# Patient Record
Sex: FEMALE | Race: WHITE | NOT HISPANIC OR LATINO
[De-identification: names, ages, dates, MRNs, and addresses within clinical notes are randomized per-mention and may not be internally consistent; named-entity substitution may affect disease eponyms.]

---

## 2023-11-13 ENCOUNTER — NON-APPOINTMENT (OUTPATIENT)
Age: 58
End: 2023-11-13

## 2023-11-13 ENCOUNTER — APPOINTMENT (OUTPATIENT)
Dept: CARDIOLOGY | Facility: CLINIC | Age: 58
End: 2023-11-13
Payer: COMMERCIAL

## 2023-11-13 VITALS
RESPIRATION RATE: 18 BRPM | WEIGHT: 293 LBS | BODY MASS INDEX: 47.09 KG/M2 | HEART RATE: 112 BPM | SYSTOLIC BLOOD PRESSURE: 132 MMHG | OXYGEN SATURATION: 98 % | HEIGHT: 66 IN | DIASTOLIC BLOOD PRESSURE: 86 MMHG | TEMPERATURE: 97.8 F

## 2023-11-13 VITALS — DIASTOLIC BLOOD PRESSURE: 84 MMHG | SYSTOLIC BLOOD PRESSURE: 136 MMHG

## 2023-11-13 DIAGNOSIS — M54.16 RADICULOPATHY, LUMBAR REGION: ICD-10-CM

## 2023-11-13 DIAGNOSIS — Z82.49 FAMILY HISTORY OF ISCHEMIC HEART DISEASE AND OTHER DISEASES OF THE CIRCULATORY SYSTEM: ICD-10-CM

## 2023-11-13 DIAGNOSIS — R03.0 ELEVATED BLOOD-PRESSURE READING, W/OUT DIAGNOSIS OF HYPERTENSION: ICD-10-CM

## 2023-11-13 DIAGNOSIS — Z87.442 PERSONAL HISTORY OF URINARY CALCULI: ICD-10-CM

## 2023-11-13 DIAGNOSIS — Z87.19 PERSONAL HISTORY OF OTHER DISEASES OF THE DIGESTIVE SYSTEM: ICD-10-CM

## 2023-11-13 PROCEDURE — 93000 ELECTROCARDIOGRAM COMPLETE: CPT

## 2023-11-13 PROCEDURE — 99204 OFFICE O/P NEW MOD 45 MIN: CPT | Mod: 25

## 2023-11-13 RX ORDER — APIXABAN 5 MG/1
5 TABLET, FILM COATED ORAL
Qty: 180 | Refills: 3 | Status: ACTIVE | COMMUNITY
Start: 2023-11-13 | End: 1900-01-01

## 2023-11-13 RX ORDER — METOPROLOL TARTRATE 25 MG/1
25 TABLET, FILM COATED ORAL TWICE DAILY
Qty: 180 | Refills: 3 | Status: ACTIVE | COMMUNITY

## 2023-11-13 RX ORDER — METOPROLOL SUCCINATE 25 MG/1
25 TABLET, EXTENDED RELEASE ORAL DAILY
Qty: 90 | Refills: 3 | Status: ACTIVE | COMMUNITY
Start: 2023-11-13 | End: 1900-01-01

## 2023-11-13 RX ORDER — ALPRAZOLAM 0.5 MG/1
0.5 TABLET ORAL
Refills: 0 | Status: ACTIVE | COMMUNITY

## 2023-11-13 RX ORDER — ZOLPIDEM TARTRATE 10 MG/1
10 TABLET ORAL
Refills: 0 | Status: ACTIVE | COMMUNITY

## 2023-11-13 RX ORDER — ALBUTEROL 90 MCG
90 AEROSOL (GRAM) INHALATION
Refills: 0 | Status: ACTIVE | COMMUNITY

## 2023-11-13 RX ORDER — ESCITALOPRAM OXALATE 20 MG/1
20 TABLET, FILM COATED ORAL
Refills: 0 | Status: ACTIVE | COMMUNITY

## 2023-12-01 ENCOUNTER — APPOINTMENT (OUTPATIENT)
Dept: CARDIOLOGY | Facility: CLINIC | Age: 58
End: 2023-12-01
Payer: COMMERCIAL

## 2023-12-01 PROCEDURE — 93306 TTE W/DOPPLER COMPLETE: CPT

## 2023-12-01 PROCEDURE — 93246 EXT ECG>7D<15D RECORDING: CPT

## 2023-12-18 ENCOUNTER — APPOINTMENT (OUTPATIENT)
Dept: BARIATRICS/WEIGHT MGMT | Facility: CLINIC | Age: 58
End: 2023-12-18
Payer: COMMERCIAL

## 2023-12-18 VITALS — HEIGHT: 66 IN | BODY MASS INDEX: 47.09 KG/M2 | WEIGHT: 293 LBS

## 2023-12-18 PROCEDURE — 97802 MEDICAL NUTRITION INDIV IN: CPT

## 2023-12-21 ENCOUNTER — APPOINTMENT (OUTPATIENT)
Dept: CARDIOLOGY | Facility: CLINIC | Age: 58
End: 2023-12-21
Payer: COMMERCIAL

## 2023-12-21 PROCEDURE — 93015 CV STRESS TEST SUPVJ I&R: CPT

## 2024-01-04 ENCOUNTER — NON-APPOINTMENT (OUTPATIENT)
Age: 59
End: 2024-01-04

## 2024-01-04 PROCEDURE — 93248 EXT ECG>7D<15D REV&INTERPJ: CPT

## 2024-01-18 ENCOUNTER — APPOINTMENT (OUTPATIENT)
Dept: BARIATRICS/WEIGHT MGMT | Facility: CLINIC | Age: 59
End: 2024-01-18
Payer: COMMERCIAL

## 2024-01-18 VITALS — HEIGHT: 66 IN | WEIGHT: 293 LBS | BODY MASS INDEX: 47.09 KG/M2

## 2024-01-18 PROCEDURE — 97803 MED NUTRITION INDIV SUBSEQ: CPT

## 2024-01-22 ENCOUNTER — APPOINTMENT (OUTPATIENT)
Dept: CARDIOLOGY | Facility: CLINIC | Age: 59
End: 2024-01-22
Payer: COMMERCIAL

## 2024-01-22 VITALS
HEART RATE: 108 BPM | BODY MASS INDEX: 47.09 KG/M2 | OXYGEN SATURATION: 99 % | HEIGHT: 66 IN | DIASTOLIC BLOOD PRESSURE: 80 MMHG | SYSTOLIC BLOOD PRESSURE: 132 MMHG | WEIGHT: 293 LBS

## 2024-01-22 DIAGNOSIS — Z92.89 PERSONAL HISTORY OF OTHER MEDICAL TREATMENT: ICD-10-CM

## 2024-01-22 DIAGNOSIS — Z13.220 ENCOUNTER FOR SCREENING FOR LIPOID DISORDERS: ICD-10-CM

## 2024-01-22 DIAGNOSIS — I48.91 UNSPECIFIED ATRIAL FIBRILLATION: ICD-10-CM

## 2024-01-22 DIAGNOSIS — Z98.890 OTHER SPECIFIED POSTPROCEDURAL STATES: ICD-10-CM

## 2024-01-22 PROCEDURE — 99214 OFFICE O/P EST MOD 30 MIN: CPT

## 2024-01-23 PROBLEM — Z98.890 HISTORY OF HOLTER MONITORING: Status: RESOLVED | Noted: 2024-01-05 | Resolved: 2024-01-23

## 2024-01-23 PROBLEM — I48.91 ATRIAL FIBRILLATION: Status: ACTIVE | Noted: 2023-11-13

## 2024-01-23 PROBLEM — Z92.89 HISTORY OF ECHOCARDIOGRAM: Status: RESOLVED | Noted: 2024-01-23 | Resolved: 2024-01-23

## 2024-01-23 PROBLEM — Z13.220 LIPID SCREENING: Status: ACTIVE | Noted: 2023-11-13

## 2024-01-23 NOTE — HISTORY OF PRESENT ILLNESS
[FreeTextEntry1] : No further vaginal bleed No chest pain shortness of breath Dizziness with quick change in position, sometimes, with quick resolution She walks 3 times a week, has been working with weight loss center and has lost 17 pounds  Patient snores and may have sleep apnea

## 2024-01-23 NOTE — REVIEW OF SYSTEMS
[Weight Loss (___ Lbs)] : [unfilled] ~Ulb weight loss [Snoring] : snoring [Under Stress] : under stress [Negative] : Heme/Lymph [FreeTextEntry5] : see HPI

## 2024-02-15 ENCOUNTER — APPOINTMENT (OUTPATIENT)
Dept: BARIATRICS/WEIGHT MGMT | Facility: CLINIC | Age: 59
End: 2024-02-15
Payer: COMMERCIAL

## 2024-02-15 VITALS — WEIGHT: 293 LBS | HEIGHT: 66 IN | BODY MASS INDEX: 47.09 KG/M2

## 2024-02-15 PROCEDURE — 97803 MED NUTRITION INDIV SUBSEQ: CPT

## 2024-02-16 ENCOUNTER — APPOINTMENT (OUTPATIENT)
Dept: PULMONOLOGY | Facility: CLINIC | Age: 59
End: 2024-02-16
Payer: COMMERCIAL

## 2024-02-16 VITALS
WEIGHT: 293 LBS | HEART RATE: 97 BPM | HEIGHT: 66 IN | BODY MASS INDEX: 47.09 KG/M2 | DIASTOLIC BLOOD PRESSURE: 72 MMHG | SYSTOLIC BLOOD PRESSURE: 126 MMHG | OXYGEN SATURATION: 99 %

## 2024-02-16 DIAGNOSIS — Z91.89 OTHER SPECIFIED PERSONAL RISK FACTORS, NOT ELSEWHERE CLASSIFIED: ICD-10-CM

## 2024-02-16 DIAGNOSIS — J45.909 UNSPECIFIED ASTHMA, UNCOMPLICATED: ICD-10-CM

## 2024-02-16 PROCEDURE — 99204 OFFICE O/P NEW MOD 45 MIN: CPT

## 2024-02-16 NOTE — HISTORY OF PRESENT ILLNESS
[TextBox_4] : 59 yo never smoker F w/ hx of AF, HTD, DM, presenting for evaluation of ZAIDA evaluation prior to HEATHER/cardioversion.  Has sleep symptoms including snoring, witnessed apneas (as conveyed to her by her ), EDS, and frequent night time awakenings. She also has some dyspnea with exertion and sensitivity to cold (wheezing, coughing) and has been prescribed albuterol, which helps her symptoms when this happens.    [Awakes Unrefreshed] : awakes unrefreshed [Awakes with Dry Mouth] : awakes with dry mouth [Awakes with Headache] : awakes with headache [Cataplexy] : denies cataplexy [Daytime Somnolence] : daytime somnolence [Difficulty Initiating Sleep] : does not have difficulty initiating sleep [Difficulty Maintaining Sleep] : difficulty maintaining sleep [Dysesthesias] : denies dysesthesias [Fatigue] : fatigue [Frequent Nocturnal Awakening] : frequent nocturnal awakening [Hypnagogic Hallucinations] : denies hypnagogic hallucinations [Hypnopompic Hallucinations] : denies hypnopompic hallucinations [Nonrestorative Sleep] : nonrestorative sleep [Paresthesias] : denies paresthesias [Recent  Weight Gain] : no recent weight gain [Sleep Paralysis] : no history of sleep paralysis [Snoring] : snoring [Tired while Driving] : not tired while driving [Unintentional Sleep while Active] : no unintentional sleep while active [Unintentional Sleep while Inactive] : unintentional sleep while inactive [Unusual Movements] : no unusual movements [Unusual Sleep Behavior] : no unusual sleep behavior [Vivid dreams] : no vivid dreams [Witnessed Apneas] : witnessed apneas

## 2024-02-16 NOTE — ASSESSMENT
[FreeTextEntry1] : 57 yo never smoker F w/ hx of AF, HTD, DM, presenting for evaluation of ZAIDA evaluation prior to HEATHER/cardioversion.   > ZAIDA > Asthma  - Sleep symptoms with AF history concerning for ZAIDA. discussed PSG vs HST which patient much prefers the latter. Discussed also possibility of needing PSG in the future if HST is equivocal with results. Will order HST for now. The ramifications of obstructive sleep apnea and its potential therapeutic modalities were discussed with the patient. The dangers of drowsy driving were discussed with the patient. The patient was warned to avoid drowsy driving. The patient will followup after results of this study are available.  - PFT + FeNO for possible asthma - currently to continue with albuterol prn.   f/u after sleep tests are resulted.

## 2024-03-14 ENCOUNTER — APPOINTMENT (OUTPATIENT)
Dept: BARIATRICS/WEIGHT MGMT | Facility: CLINIC | Age: 59
End: 2024-03-14
Payer: COMMERCIAL

## 2024-03-14 PROCEDURE — 97803 MED NUTRITION INDIV SUBSEQ: CPT

## 2024-03-21 ENCOUNTER — APPOINTMENT (OUTPATIENT)
Dept: BARIATRICS/WEIGHT MGMT | Facility: CLINIC | Age: 59
End: 2024-03-21
Payer: COMMERCIAL

## 2024-03-21 VITALS
RESPIRATION RATE: 16 BRPM | HEART RATE: 87 BPM | DIASTOLIC BLOOD PRESSURE: 74 MMHG | OXYGEN SATURATION: 95 % | WEIGHT: 293 LBS | BODY MASS INDEX: 47.09 KG/M2 | SYSTOLIC BLOOD PRESSURE: 113 MMHG | HEIGHT: 66 IN

## 2024-03-21 DIAGNOSIS — Z91.89 OTHER SPECIFIED PERSONAL RISK FACTORS, NOT ELSEWHERE CLASSIFIED: ICD-10-CM

## 2024-03-21 DIAGNOSIS — Z00.00 ENCOUNTER FOR GENERAL ADULT MEDICAL EXAMINATION W/OUT ABNORMAL FINDINGS: ICD-10-CM

## 2024-03-21 DIAGNOSIS — Z83.49 FAMILY HISTORY OF OTHER ENDOCRINE, NUTRITIONAL AND METABOLIC DISEASES: ICD-10-CM

## 2024-03-21 DIAGNOSIS — I48.91 UNSPECIFIED ATRIAL FIBRILLATION: ICD-10-CM

## 2024-03-21 PROCEDURE — G2211 COMPLEX E/M VISIT ADD ON: CPT

## 2024-03-21 PROCEDURE — 99205 OFFICE O/P NEW HI 60 MIN: CPT

## 2024-03-21 NOTE — HISTORY OF PRESENT ILLNESS
[FreeTextEntry1] : 58F PMH class III obesity, DM2, atrial fibrillation, nephrolithiasis, cholelithiasis s/p lap diogo, low vitamin d, GERD, asthma, who presents to weight management for initial evaluation.   Weight/Diet History: Always struggled with her weight, has dieted in the past but struggling more with losing weight  Has engaged in numerous weight loss interventions, including Weight Watchers, tracking intake, and working with a dietician.  Highest adult weight ~365 lbs   Weight today: 346 lbs 2 oz, BMI 55.87, BF 57.5%   Diet: See's Jenn Gupta, dietician. More plant-based meals and avoiding processed carbs. B (9 am): Egg white fritata, coffee with hazelnut creamer L (1 pm): Grilled chicken and roasted veg, quinoa or brown rice D (7:30 pm): salmon/chicken/turkey burger with vegetables.  Dessert: Snack: fruit or nuts in afternoon Night-time eating: Struggles with night eating, trying to keep things out of the house. Beverages: coffee with hazelnut creamer, rare alcohol, cut out diet soda. Binging: Fast-food/Restaurants: 1x/wk takeout Cook/Prepare meals: Added oils: [employer]: trying now   Exercise: No exercise limitations. None currently. Tired by evening.   Sleep: "not great", trouble falling asleep, falls asleep ~1 am, awake 7 am, gets 5-6 hours. Does snore, pending sleep study.   Social Hx: Never a smoker, no drug use, occasional social alcohol intake.  has 1 child.  from office. Lives with , has a daughter in college.   Has a cousin w/hx thyroid cancer, unknown subtype, no other relatives known to have thyroid cancer or any other malignancy/condition associated w/MEN syndrome. We discussed association in rats w/MTC and unknown association in humans w/1st degree relatives w/MTC.   Labs (11/3/23): CBC, CMP, Lipids, TSH. A1c 6.5%, Vit D 7.2

## 2024-03-21 NOTE — ASSESSMENT
[FreeTextEntry1] : 58F PMH class III obesity, DM2, atrial fibrillation, nephrolithiasis, cholelithiasis s/p lap diogo, low vitamin d, GERD, asthma, who presents to weight management for initial evaluation.   # Class III obesity c/b DM2, AFib, vit d deficiency, GERD, s/p lap diogo, vit D deficiency: Weight today 346 lbs 2 oz, BMI 55.87, BF 57.5%. Has struggled with her weight throughout her life, was better able to lose in the past but now with age finding it more difficult. She has been working with Jenn on diet and lost ~20 lbs from her max weight ~365 lbs. Has noted improvements in diet, still finds it hard not to snack at night. She has a sedentary lifestyle. Some suspicion for ZAIDA and pending sleep study. She has trepidations about undergoing bariatric surgery, particularly w/afib on AC. She would like to trial medical intervention first.  - C/w dietician and dietary recs - Build in activity as she loses weight, activity advisor referral provided - Agree with sleep study - Updated labs ordered - May consider rhythm swab - Medical treatments discussed. GLP therapy prescribed, pending insurance auth.  - F/u in 1 month    Z/O... ?M1st, then o/mo

## 2024-03-24 LAB
25(OH)D3 SERPL-MCNC: 16.3 NG/ML
ALBUMIN SERPL ELPH-MCNC: 4.1 G/DL
ALP BLD-CCNC: 92 U/L
ALT SERPL-CCNC: 15 U/L
ANION GAP SERPL CALC-SCNC: 12 MMOL/L
AST SERPL-CCNC: 17 U/L
BILIRUB SERPL-MCNC: 0.3 MG/DL
BUN SERPL-MCNC: 15 MG/DL
CALCIUM SERPL-MCNC: 9.4 MG/DL
CHLORIDE SERPL-SCNC: 103 MMOL/L
CHOLEST SERPL-MCNC: 201 MG/DL
CO2 SERPL-SCNC: 26 MMOL/L
CREAT SERPL-MCNC: 0.86 MG/DL
CRP SERPL HS-MCNC: 11.76 MG/L
EGFR: 79 ML/MIN/1.73M2
ESTIMATED AVERAGE GLUCOSE: 103 MG/DL
GLUCOSE SERPL-MCNC: 124 MG/DL
HBA1C MFR BLD HPLC: 5.2 %
HCT VFR BLD CALC: 42.1 %
HDLC SERPL-MCNC: 61 MG/DL
HGB BLD-MCNC: 13.8 G/DL
INSULIN SERPL-MCNC: 63.7 UU/ML
LDLC SERPL CALC-MCNC: 124 MG/DL
MCHC RBC-ENTMCNC: 29 PG
MCHC RBC-ENTMCNC: 32.8 GM/DL
MCV RBC AUTO: 88.4 FL
NONHDLC SERPL-MCNC: 140 MG/DL
PLATELET # BLD AUTO: 250 K/UL
POTASSIUM SERPL-SCNC: 4.5 MMOL/L
PROT SERPL-MCNC: 7.3 G/DL
RBC # BLD: 4.76 M/UL
RBC # FLD: 13.5 %
SODIUM SERPL-SCNC: 141 MMOL/L
T4 FREE SERPL-MCNC: 1 NG/DL
TRIGL SERPL-MCNC: 88 MG/DL
TSH SERPL-ACNC: 2.01 UIU/ML
WBC # FLD AUTO: 7.64 K/UL

## 2024-04-22 ENCOUNTER — APPOINTMENT (OUTPATIENT)
Dept: BARIATRICS/WEIGHT MGMT | Facility: CLINIC | Age: 59
End: 2024-04-22
Payer: COMMERCIAL

## 2024-04-22 VITALS
HEIGHT: 66 IN | WEIGHT: 293 LBS | SYSTOLIC BLOOD PRESSURE: 126 MMHG | RESPIRATION RATE: 16 BRPM | OXYGEN SATURATION: 99 % | DIASTOLIC BLOOD PRESSURE: 76 MMHG | BODY MASS INDEX: 47.09 KG/M2 | HEART RATE: 71 BPM

## 2024-04-22 PROCEDURE — G2211 COMPLEX E/M VISIT ADD ON: CPT

## 2024-04-22 PROCEDURE — 99214 OFFICE O/P EST MOD 30 MIN: CPT

## 2024-04-22 RX ORDER — TIRZEPATIDE 2.5 MG/.5ML
2.5 INJECTION, SOLUTION SUBCUTANEOUS
Qty: 1 | Refills: 1 | Status: DISCONTINUED | COMMUNITY
Start: 2024-03-21 | End: 2024-04-22

## 2024-04-22 RX ORDER — SEMAGLUTIDE 0.25 MG/.5ML
0.25 INJECTION, SOLUTION SUBCUTANEOUS
Qty: 1 | Refills: 1 | Status: DISCONTINUED | COMMUNITY
Start: 2024-03-21 | End: 2024-04-22

## 2024-04-22 NOTE — ASSESSMENT
[FreeTextEntry1] : 58F PMH class III obesity, DM2, atrial fibrillation, nephrolithiasis, cholelithiasis s/p lap diogo, low vitamin d, GERD, asthma, who presents to weight management for follow-up.   # Class III obesity c/b DM2, AFib, vit d deficiency, GERD, s/p lap diogo, vit D deficiency: Weight today 338 lbs, BMI 54.56, down 8 lbs since initial visit 1 month ago, on Ozempic .25 mg/wk for 3 doses. Seems to have responded well to initial dosing. Maintaining improved dietary choices that she's worked on with dietician but eating less. Remains sedentary but plans to start walks. - C/w dietician and dietary recs - Build in activity as she loses weight, activity advisor referral provided. Goal to set at least 2 walks per week. - Agree with sleep study - Updated labs ordered - May consider rhythm swab - C/w Ozempic 0.25 mg/wk through 4th dose, then increase to 0.5 mg/wk, will likely c/w this dose through at least 6 weeks. - Labs reviewed, considerable A1c improvement over the past 6 months with dietary changes and weight loss. Her weight is also responding very well to low-dose ozempic.  - F/u in 1 month    Z/O... ?M1st, then o/mo

## 2024-04-22 NOTE — HISTORY OF PRESENT ILLNESS
[FreeTextEntry1] : 58F PMH class III obesity, DM2, atrial fibrillation, nephrolithiasis, cholelithiasis s/p lap diogo, low vitamin d, GERD, asthma, who presents to weight management for follow-up.   She is an  who initially presented 3/2024 reporting she has struggled with her weight throughout her life, was better able to lose in the past but now with age finding it more difficult. She had already been working with our dietician Jenn and lost ~20 lbs from her max weight ~365 lbs. She noted struggling with night snacks. Sedentary lifestyle. Some suspicion for ZAIDA and pending sleep study. She has trepidations about undergoing bariatric surgery, particularly w/afib on AC. She would like to trial medical intervention first.  We discussed dietary and lifestyle interventions. She was prescribed Ozempic  Weight today: 338 lbs, BMI 54.56, BF 59.5% Weight at Initial Visit (3/21/24): 346 lbs 2 oz, BMI 55.87, BF 57.5%   Medication: She has been on Ozempic .25 mg/wk, reports some nausea/diarrhea the first 3 days which subsequently resolved.   Diet: Working with Jenn Gupta, dietician, more plant-based meals and avoiding processed carbs. Feels more held over with smaller meals.    Exercise: Hasn't started exercise yet but has goal to start walking a few days per week.   Sleep: "not great", trouble falling asleep, falls asleep ~1 am, awake 7 am, gets 5-6 hours. Does snore, pending sleep study.  Has a cousin w/hx thyroid cancer, unknown subtype, no other relatives known to have thyroid cancer or any other malignancy/condition associated w/MEN syndrome. We discussed association in rats w/MTC and unknown association in humans w/1st degree relatives w/MTC.   Labs (11/3/23): CBC, CMP, Lipids, TSH. A1c 6.5%, Vit D 7.2

## 2024-05-02 ENCOUNTER — APPOINTMENT (OUTPATIENT)
Dept: BARIATRICS/WEIGHT MGMT | Facility: CLINIC | Age: 59
End: 2024-05-02
Payer: COMMERCIAL

## 2024-05-02 VITALS — WEIGHT: 293 LBS | HEIGHT: 66 IN | BODY MASS INDEX: 47.09 KG/M2

## 2024-05-02 PROCEDURE — 97803 MED NUTRITION INDIV SUBSEQ: CPT

## 2024-05-23 ENCOUNTER — APPOINTMENT (OUTPATIENT)
Dept: BARIATRICS/WEIGHT MGMT | Facility: CLINIC | Age: 59
End: 2024-05-23
Payer: COMMERCIAL

## 2024-05-23 VITALS
DIASTOLIC BLOOD PRESSURE: 75 MMHG | SYSTOLIC BLOOD PRESSURE: 131 MMHG | OXYGEN SATURATION: 95 % | RESPIRATION RATE: 16 BRPM | BODY MASS INDEX: 53.45 KG/M2 | WEIGHT: 293 LBS | HEART RATE: 90 BPM

## 2024-05-23 PROCEDURE — G2211 COMPLEX E/M VISIT ADD ON: CPT

## 2024-05-23 PROCEDURE — 99214 OFFICE O/P EST MOD 30 MIN: CPT

## 2024-05-23 NOTE — PHYSICAL EXAM
[Obese, well nourished, in no acute distress] : obese, well nourished, in no acute distress [de-identified] : TEB visit

## 2024-05-23 NOTE — ASSESSMENT
[FreeTextEntry1] : 58F PMH class III obesity, DM2, atrial fibrillation, nephrolithiasis, cholelithiasis s/p lap diogo, low vitamin d, GERD, asthma, who presents to weight management for follow-up.   # Class III obesity c/b DM2, AFib, vit d deficiency, GERD, s/p lap diogo, vit D deficiency: Weight today 331 lbs 2 oz, BMI 53.45, BF 56.6%, down 7 lbs since last visit 4 weeks ago, and 15 lbs since initial visit 2 months ago, ~35 lbs since she started with dietiican in 11/2023. Doing well on Ozempic, has mostly been on 0.25 mg, just started 0.5 mg, noting appetite suppression and no side effects. Working with dietician and making improvements. No formal exercise but walking/activity has increased. Diagnosed with severe ZAIDA, pending CPAP.  - C/w dietician and dietary recs - Build in activity as she loses weight, activity advisor referral provided. Goal to set at least 2 walks per week. - Agree with sleep study - Updated labs ordered - May consider rhythm swab - C/w Ozempic 0.5 mg/wk through this pen, will either repeat or increase to 1 mg - F/u in 1-2 month    Z/O... ?M1st, then o/mo

## 2024-05-23 NOTE — HISTORY OF PRESENT ILLNESS
[FreeTextEntry1] : 58F PMH class III obesity, DM2, atrial fibrillation, nephrolithiasis, cholelithiasis s/p lap diogo, low vitamin d, GERD, asthma, who presents to weight management for follow-up.   She is an  who initially presented 3/2024 reporting she has struggled with her weight throughout her life, was better able to lose in the past but now with age finding it more difficult. She had already been working with our dietician Jenn and lost ~20 lbs from her max weight ~365 lbs. She noted struggling with night snacks. Sedentary lifestyle. Some suspicion for ZAIDA and pending sleep study. She has trepidations about undergoing bariatric surgery, particularly w/afib on AC. She would like to trial medical intervention first.  We discussed dietary and lifestyle interventions. She was prescribed Ozempic  Weight today: 331 lbs 2 oz, BMI 53.45, BF 56.6% Weight at last visit (4/22/24): 338 lbs, BMI 54.56, BF 59.5% Weight at Initial Visit (3/21/24): 346 lbs 2 oz, BMI 55.87, BF 57.5%   Medication: She has been on Ozempic 0.5 mg/wk x 1 week, no side effects, does note appetite reduction.  Diet: Working with Jenn Gupta, dietician, more plant-based meals and avoiding processed carbs. Feels more held over with smaller meals. Not eating anything 'heavy' or fried, or highly refined carbs.   Exercise: Hasn't started exercise yet but has goal to start walking a few days per week. Walking around town and parking further away.  Sleep: Was diagnosed with severe ZAIDA, pending CPAP. At first visit - "not great", trouble falling asleep, falls asleep ~1 am, awake 7 am, gets 5-6 hours.   Has a cousin w/hx thyroid cancer, unknown subtype, no other relatives known to have thyroid cancer or any other malignancy/condition associated w/MEN syndrome. We discussed association in rats w/MTC and unknown association in humans w/1st degree relatives w/MTC.   Labs (11/3/23): CBC, CMP, Lipids, TSH. A1c 6.5%, Vit D 7.2

## 2024-06-13 ENCOUNTER — APPOINTMENT (OUTPATIENT)
Dept: BARIATRICS/WEIGHT MGMT | Facility: CLINIC | Age: 59
End: 2024-06-13

## 2024-06-28 PROBLEM — E11.9 DIABETES MELLITUS, TYPE 2: Status: ACTIVE | Noted: 2023-11-13

## 2024-06-28 PROBLEM — E66.01 CLASS 3 SEVERE OBESITY WITH SERIOUS COMORBIDITY IN ADULT: Status: ACTIVE | Noted: 2024-03-21

## 2024-06-28 PROBLEM — E55.9 VITAMIN D DEFICIENCY: Status: ACTIVE | Noted: 2024-03-21

## 2024-07-01 ENCOUNTER — APPOINTMENT (OUTPATIENT)
Dept: BARIATRICS/WEIGHT MGMT | Facility: CLINIC | Age: 59
End: 2024-07-01
Payer: COMMERCIAL

## 2024-07-01 VITALS
BODY MASS INDEX: 47.09 KG/M2 | RESPIRATION RATE: 16 BRPM | DIASTOLIC BLOOD PRESSURE: 87 MMHG | HEIGHT: 66 IN | WEIGHT: 293 LBS | HEART RATE: 85 BPM | SYSTOLIC BLOOD PRESSURE: 134 MMHG | OXYGEN SATURATION: 96 %

## 2024-07-01 DIAGNOSIS — E55.9 VITAMIN D DEFICIENCY, UNSPECIFIED: ICD-10-CM

## 2024-07-01 DIAGNOSIS — E66.01 MORBID (SEVERE) OBESITY DUE TO EXCESS CALORIES: ICD-10-CM

## 2024-07-01 DIAGNOSIS — E11.9 TYPE 2 DIABETES MELLITUS W/OUT COMPLICATIONS: ICD-10-CM

## 2024-07-01 PROCEDURE — 99214 OFFICE O/P EST MOD 30 MIN: CPT

## 2024-07-01 PROCEDURE — G2211 COMPLEX E/M VISIT ADD ON: CPT

## 2024-09-19 ENCOUNTER — APPOINTMENT (OUTPATIENT)
Dept: BARIATRICS/WEIGHT MGMT | Facility: CLINIC | Age: 59
End: 2024-09-19
Payer: COMMERCIAL

## 2024-09-19 VITALS
WEIGHT: 293 LBS | RESPIRATION RATE: 16 BRPM | SYSTOLIC BLOOD PRESSURE: 109 MMHG | HEART RATE: 83 BPM | DIASTOLIC BLOOD PRESSURE: 74 MMHG | OXYGEN SATURATION: 95 % | HEIGHT: 66 IN | BODY MASS INDEX: 47.09 KG/M2

## 2024-09-19 DIAGNOSIS — E11.9 TYPE 2 DIABETES MELLITUS W/OUT COMPLICATIONS: ICD-10-CM

## 2024-09-19 DIAGNOSIS — E66.01 MORBID (SEVERE) OBESITY DUE TO EXCESS CALORIES: ICD-10-CM

## 2024-09-19 PROCEDURE — G2211 COMPLEX E/M VISIT ADD ON: CPT | Mod: NC

## 2024-09-19 PROCEDURE — 99214 OFFICE O/P EST MOD 30 MIN: CPT

## 2024-09-19 RX ORDER — TIRZEPATIDE 5 MG/.5ML
5 INJECTION, SOLUTION SUBCUTANEOUS
Qty: 1 | Refills: 1 | Status: ACTIVE | COMMUNITY
Start: 2024-09-19 | End: 1900-01-01

## 2024-09-19 NOTE — ASSESSMENT
[FreeTextEntry1] : 59F PMH class III obesity, DM2, atrial fibrillation, nephrolithiasis, cholelithiasis s/p lap diogo, low vitamin d, GERD, asthma, who presents to weight management for follow-up.   # Class III obesity c/b DM2, AFib, vit d deficiency, GERD, s/p lap diogo, vit D deficiency: Weight today 325 lbs 2 oz, BMI 52.48, BF 56.8%, has lost 5 lbs since last visit ~10 weeks ago, 21 lbs since first visit 6 months ago, and 35 lbs since starting with dietician. No adverse effect on Ozempic 2 mg/wk, but weight loss has been very gradual, given where her weight is now, and the response up to maximal dosing, we discussed considering alternatives. Does note exercise has reduced due to fatigue. Using CPAP.  - C/w dietician and dietary recs - Build in activity as she loses weight, activity advisor referral provided. Goal to set at least 2 walks per week. - May consider rhythm swab - Labs at next visit - Will switch to Mounjaro 5 mg/wk, likely titrate up the following month - F/u in 1 month    Z/O... ?M1st, then o/mo

## 2024-09-19 NOTE — HISTORY OF PRESENT ILLNESS
[FreeTextEntry1] : 59F PMH class III obesity, DM2, atrial fibrillation, nephrolithiasis, cholelithiasis s/p lap diogo, low vitamin d, GERD, asthma, who presents to weight management for follow-up.   She is an  who initially presented 3/2024 reporting she has struggled with her weight throughout her life, was better able to lose in the past but now with age finding it more difficult. She had already been working with our dietician Jenn and lost ~20 lbs from her max weight ~365 lbs. She noted struggling with night snacks. Sedentary lifestyle. Some suspicion for ZAIDA and pending sleep study. She has trepidations about undergoing bariatric surgery, particularly w/afib on AC. She would like to trial medical intervention first.  We discussed dietary and lifestyle interventions. She was prescribed Ozempic  Weight today: 325 lbs 2 oz, BMI 52.48, BF 56.8% Weight at last visit (7/1/24): 330 lbs, BMI 53.26 Weight (5/23/24): 331 lbs 2 oz, BMI 53.45, BF 56.6% Weight at Initial Visit (3/21/24): 346 lbs 2 oz, BMI 55.87, BF 57.5% Started with Jenn Gupta 365 Medication: She has been on Ozempic 2 mg/wk, no side effects, gets full faster. Been on this dose for 2 months.   Diet: Eating a bit less, but not planning as much during the summer. Avoiding fried food and ice cream. Tries 3 meals per day. Saw Jenn Gupta, dietician, more plant-based meals and avoiding processed carbs. Feels more held over with smaller meals. Not eating anything 'heavy' or fried, or highly refined carbs.   Exercise: Limited by fatigue, was walking more in ~July (at-home video walking) but tailed off in August.   Sleep: Started using CPAP, feels it helps her sleep more deeply without awakenings.   Labs (11/3/23): CBC, CMP, Lipids, TSH. A1c 6.5%, Vit D 7.2

## 2024-10-30 PROBLEM — E66.813 CLASS 3 SEVERE OBESITY WITH SERIOUS COMORBIDITY IN ADULT: Status: ACTIVE | Noted: 2024-03-21

## 2024-10-31 ENCOUNTER — APPOINTMENT (OUTPATIENT)
Dept: BARIATRICS/WEIGHT MGMT | Facility: CLINIC | Age: 59
End: 2024-10-31
Payer: COMMERCIAL

## 2024-10-31 VITALS
RESPIRATION RATE: 16 BRPM | SYSTOLIC BLOOD PRESSURE: 117 MMHG | HEART RATE: 83 BPM | HEIGHT: 66 IN | DIASTOLIC BLOOD PRESSURE: 73 MMHG | OXYGEN SATURATION: 97 % | WEIGHT: 293 LBS | BODY MASS INDEX: 47.09 KG/M2

## 2024-10-31 DIAGNOSIS — E66.01 OBESITY, CLASS 3: ICD-10-CM

## 2024-10-31 DIAGNOSIS — E11.9 TYPE 2 DIABETES MELLITUS W/OUT COMPLICATIONS: ICD-10-CM

## 2024-10-31 DIAGNOSIS — E55.9 VITAMIN D DEFICIENCY, UNSPECIFIED: ICD-10-CM

## 2024-10-31 DIAGNOSIS — E66.813 OBESITY, CLASS 3: ICD-10-CM

## 2024-10-31 PROCEDURE — 99214 OFFICE O/P EST MOD 30 MIN: CPT

## 2024-10-31 PROCEDURE — G2211 COMPLEX E/M VISIT ADD ON: CPT | Mod: NC

## 2024-10-31 RX ORDER — TIRZEPATIDE 5 MG/.5ML
5 INJECTION, SOLUTION SUBCUTANEOUS
Qty: 1 | Refills: 2 | Status: ACTIVE | COMMUNITY
Start: 2024-10-31 | End: 1900-01-01

## 2024-11-01 LAB
25(OH)D3 SERPL-MCNC: 7.8 NG/ML
ALBUMIN SERPL ELPH-MCNC: 4.1 G/DL
ALP BLD-CCNC: 96 U/L
ALT SERPL-CCNC: 18 U/L
ANION GAP SERPL CALC-SCNC: 12 MMOL/L
AST SERPL-CCNC: 16 U/L
BILIRUB SERPL-MCNC: 0.4 MG/DL
BUN SERPL-MCNC: 15 MG/DL
CALCIUM SERPL-MCNC: 9.5 MG/DL
CHLORIDE SERPL-SCNC: 104 MMOL/L
CHOLEST SERPL-MCNC: 212 MG/DL
CO2 SERPL-SCNC: 26 MMOL/L
CREAT SERPL-MCNC: 0.94 MG/DL
EGFR: 71 ML/MIN/1.73M2
ESTIMATED AVERAGE GLUCOSE: 108 MG/DL
GLUCOSE SERPL-MCNC: 104 MG/DL
HBA1C MFR BLD HPLC: 5.4 %
HCT VFR BLD CALC: 43.6 %
HDLC SERPL-MCNC: 68 MG/DL
HGB BLD-MCNC: 14 G/DL
INSULIN SERPL-MCNC: 30.5 UU/ML
LDLC SERPL CALC-MCNC: 131 MG/DL
MCHC RBC-ENTMCNC: 28.9 PG
MCHC RBC-ENTMCNC: 32.1 G/DL
MCV RBC AUTO: 89.9 FL
NONHDLC SERPL-MCNC: 144 MG/DL
PLATELET # BLD AUTO: 240 K/UL
POTASSIUM SERPL-SCNC: 4.7 MMOL/L
PROT SERPL-MCNC: 7.5 G/DL
RBC # BLD: 4.85 M/UL
RBC # FLD: 13.1 %
SODIUM SERPL-SCNC: 142 MMOL/L
T3FREE SERPL-MCNC: 2.44 PG/ML
T4 FREE SERPL-MCNC: 1 NG/DL
TRIGL SERPL-MCNC: 72 MG/DL
TSH SERPL-ACNC: 1.56 UIU/ML
WBC # FLD AUTO: 7.72 K/UL

## 2024-11-01 RX ORDER — ERGOCALCIFEROL 1.25 MG/1
1.25 MG CAPSULE, LIQUID FILLED ORAL
Qty: 12 | Refills: 0 | Status: ACTIVE | COMMUNITY
Start: 2024-11-01 | End: 1900-01-01

## 2024-11-20 ENCOUNTER — APPOINTMENT (OUTPATIENT)
Dept: PULMONOLOGY | Facility: CLINIC | Age: 59
End: 2024-11-20
Payer: COMMERCIAL

## 2024-11-20 VITALS
WEIGHT: 293 LBS | OXYGEN SATURATION: 98 % | BODY MASS INDEX: 47.09 KG/M2 | DIASTOLIC BLOOD PRESSURE: 82 MMHG | HEIGHT: 66 IN | SYSTOLIC BLOOD PRESSURE: 126 MMHG | HEART RATE: 113 BPM

## 2024-11-20 DIAGNOSIS — J45.909 UNSPECIFIED ASTHMA, UNCOMPLICATED: ICD-10-CM

## 2024-11-20 DIAGNOSIS — Z91.89 OTHER SPECIFIED PERSONAL RISK FACTORS, NOT ELSEWHERE CLASSIFIED: ICD-10-CM

## 2024-11-20 DIAGNOSIS — G47.30 SLEEP APNEA, UNSPECIFIED: ICD-10-CM

## 2024-11-20 PROCEDURE — 99213 OFFICE O/P EST LOW 20 MIN: CPT

## 2025-01-02 ENCOUNTER — APPOINTMENT (OUTPATIENT)
Dept: BARIATRICS/WEIGHT MGMT | Facility: CLINIC | Age: 60
End: 2025-01-02

## 2025-01-06 ENCOUNTER — APPOINTMENT (OUTPATIENT)
Dept: PULMONOLOGY | Facility: CLINIC | Age: 60
End: 2025-01-06
Payer: COMMERCIAL

## 2025-01-06 DIAGNOSIS — G47.30 SLEEP APNEA, UNSPECIFIED: ICD-10-CM

## 2025-01-06 PROCEDURE — 99422 OL DIG E/M SVC 11-20 MIN: CPT

## 2025-03-12 ENCOUNTER — APPOINTMENT (OUTPATIENT)
Dept: PULMONOLOGY | Facility: CLINIC | Age: 60
End: 2025-03-12
Payer: COMMERCIAL

## 2025-03-12 DIAGNOSIS — G47.30 SLEEP APNEA, UNSPECIFIED: ICD-10-CM

## 2025-03-12 PROCEDURE — 99213 OFFICE O/P EST LOW 20 MIN: CPT | Mod: 93
